# Patient Record
Sex: FEMALE | Race: OTHER
[De-identification: names, ages, dates, MRNs, and addresses within clinical notes are randomized per-mention and may not be internally consistent; named-entity substitution may affect disease eponyms.]

---

## 2019-06-13 ENCOUNTER — APPOINTMENT (OUTPATIENT)
Dept: PLASTIC SURGERY | Facility: CLINIC | Age: 84
End: 2019-06-13
Payer: MEDICARE

## 2019-06-13 VITALS
BODY MASS INDEX: 25.49 KG/M2 | DIASTOLIC BLOOD PRESSURE: 80 MMHG | OXYGEN SATURATION: 99 % | RESPIRATION RATE: 12 BRPM | HEART RATE: 77 BPM | WEIGHT: 135 LBS | TEMPERATURE: 98 F | SYSTOLIC BLOOD PRESSURE: 179 MMHG | HEIGHT: 61 IN

## 2019-06-13 DIAGNOSIS — L81.9 DISORDER OF PIGMENTATION, UNSPECIFIED: ICD-10-CM

## 2019-06-13 PROBLEM — Z00.00 ENCOUNTER FOR PREVENTIVE HEALTH EXAMINATION: Status: ACTIVE | Noted: 2019-06-13

## 2019-06-13 PROCEDURE — 99202 OFFICE O/P NEW SF 15 MIN: CPT

## 2019-06-13 PROCEDURE — 11311 SHAVE SKIN LESION 0.6-1.0 CM: CPT

## 2019-06-13 PROCEDURE — 99212 OFFICE O/P EST SF 10 MIN: CPT | Mod: 25

## 2019-06-19 PROBLEM — L81.9 PIGMENTED SKIN LESION OF UNCERTAIN NATURE: Status: ACTIVE | Noted: 2019-06-19

## 2019-06-19 NOTE — PROCEDURE
[FreeTextEntry1] : pigmented r facial lesions x2  [Nl] : Minimal [FreeTextEntry6] : pt is 85 y/o wf c growing lesions on r face  rbal/s outlined  under aseptic conditions and local anesthetic plain 1% lido shaved > 5 mm lesions x  2 /.  desiccated bases with e cautery   application of bacitracin and bandage .  [FreeTextEntry2] : shave biopsy x 2

## 2021-11-02 ENCOUNTER — APPOINTMENT (OUTPATIENT)
Dept: PLASTIC SURGERY | Facility: CLINIC | Age: 86
End: 2021-11-02